# Patient Record
Sex: FEMALE | Race: WHITE | URBAN - METROPOLITAN AREA
[De-identification: names, ages, dates, MRNs, and addresses within clinical notes are randomized per-mention and may not be internally consistent; named-entity substitution may affect disease eponyms.]

---

## 2017-06-01 ENCOUNTER — PRE VISIT (OUTPATIENT)
Dept: OTOLARYNGOLOGY | Facility: CLINIC | Age: 62
End: 2017-06-01

## 2017-06-01 NOTE — TELEPHONE ENCOUNTER
ENT PRE VISIT NOTE    On the phone call:    Date of Call: June 1, 2017  Date of appointment: June 19, 2017  Reason for Call (Should match scheduling appointment note): Dysphonia   Who made the initial call:  (patient, Parent (Name of Parent), referral source) Patient   Who is the Referring Provider? (Name, address, phone number, location of clinic) Self   Where have you been seen for this condition[facility]  Where and what testing has been done including: None  ENT related surgeries in the past: Yes rhinoplasty   Emailed [facility].    Request medical records: No  From where:   What date:   Who did you speak to:   Is the information already in the EMR? No         Have films been pushed to PAC and when? no.     Have slides been sent to pathology and when?       Have outside records been received? No

## 2017-10-26 NOTE — TELEPHONE ENCOUNTER
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 Pt also cites throat has felt dry   Ref Provider/Clinic: Self   Patient Contact (Y/N) & Call Details: Yes, No Answer. Need to find out where related records are.    Action: LVM     RECORDS CLINIC NAME  (\"None\" if no records ) RECEIVED RECS & IMG? Y/N   (may include other helpful notes)   Internal Clinics: None    External Clinics: None per pt             "

## 2017-10-26 NOTE — TELEPHONE ENCOUNTER
Phone Call:    Who did you talk to? (or) Who did you call? Patient   Call Detail/Action: Patient returned call, says she has no records for issue

## 2017-11-06 ENCOUNTER — PRE VISIT (OUTPATIENT)
Dept: OTOLARYNGOLOGY | Facility: CLINIC | Age: 62
End: 2017-11-06

## 2017-11-06 ENCOUNTER — OFFICE VISIT (OUTPATIENT)
Dept: OTOLARYNGOLOGY | Facility: CLINIC | Age: 62
End: 2017-11-06

## 2017-11-06 DIAGNOSIS — J38.7 IRRITABLE LARYNX: ICD-10-CM

## 2017-11-06 DIAGNOSIS — R49.0 MUSCLE TENSION DYSPHONIA: Primary | ICD-10-CM

## 2017-11-06 DIAGNOSIS — J38.7 IRRITABLE LARYNX SYNDROME: ICD-10-CM

## 2017-11-06 DIAGNOSIS — J31.0 CHRONIC RHINITIS, UNSPECIFIED TYPE: ICD-10-CM

## 2017-11-06 DIAGNOSIS — R49.0 DYSPHONIA: Primary | ICD-10-CM

## 2017-11-06 RX ORDER — FLUOXETINE 10 MG/1
30 CAPSULE ORAL
COMMUNITY
Start: 2017-10-12

## 2017-11-06 RX ORDER — EPINEPHRINE 0.3 MG/.3ML
0.3 INJECTION SUBCUTANEOUS
COMMUNITY
Start: 2016-09-22

## 2017-11-06 RX ORDER — IBUPROFEN 600 MG/1
600 TABLET, FILM COATED ORAL
COMMUNITY
Start: 2015-04-02

## 2017-11-06 RX ORDER — LEVOTHYROXINE SODIUM 112 UG/1
112 TABLET ORAL
COMMUNITY
Start: 2017-03-20

## 2017-11-06 ASSESSMENT — PAIN SCALES - GENERAL: PAINLEVEL: NO PAIN (0)

## 2017-11-06 NOTE — PATIENT INSTRUCTIONS
1.  You were seen in the ENT Clinic today by Dr. Dunn.  If you have any questions or concerns after your appointment, please call [phone].  Press option #1 for scheduling related needs.  Press option #3 for Nurse advice.  2.  Please initiate speech therapy at the [facility] - [facility].     Alexandrea NUÑEZ, RN  [facility] ENT   Head & Neck Surgery

## 2017-11-06 NOTE — NURSING NOTE
Chief Complaint   Patient presents with     Consult     Vocal dryness and phlegm build up      Eric Nunez

## 2017-11-06 NOTE — LETTER
Date:November 13, 2017      Patient was self referred, no letter generated. Do not send.        [facility] Information

## 2017-11-06 NOTE — LETTER
11/6/2017       RE: Carol More  [address] [address]     Dear Colleague,    Thank you for referring your patient, Carol More, to the [facility] at [facility]. Please see a copy of my visit note below.    [facility]  Donavan Ford Jr., M.D., F.A.C.S.  Meghana Dunn M.D., M.P.H.  Marine Cardozo, Ph.D., CCC/SLP  Pebbles Cleveland M.M. (voice), M.A., CCC/SLP  Maximiliano Dela Cruz M.M. (voice), M.A., CCC/SLP    Patient: Carol More  Date of Visit: 11/6/2017    CHIEF COMPLAINT: dysphonia    HISTORY  Carol More was seen for initial voice evaluation and laryngeal examination in conjunction with a visit to Dr. Dunn.  Please refer to Dr. Dunn s dictation for additional history and impressions. Salient details of her history are as follows:    Ms. More is a 62 year old female with a history of dysphonia.    Symptoms began a couple years ago and have gradually worsened.    CURRENT SYMPTOMS INCLUDE  VOICE    Complains of gradually worsening voice breaks, change of volume and range.    Worse in the morning and with heavy use.    Reports that her typical vocal demands are low; quiet for long periods of time throughout the day.  She is now home most days, and running errands.  If she reads a book out loud she will become dry after reading several pages.    Sings approximately 20 hours per month; sings soprano.  Symptoms have most significantly changed within the past year.    Singing voice: most affected, especially her upper register.  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    Feeling performance anxiety to make the note, and throat clearing more to try to achieve the pitches she is trying to reach.    A couple rehearsals ago, she felt that her voice was clear, but is not sure why it was better. Occurred during a Wednesday evening rehearsal.    She is concerned that her voice issues are related to  "her age.  COUGH/THROAT CLEARING    Complains of throat tightness, dry throat, mucus/ post nasal drainage in the throat and frequent throat clearing.     Symptoms have been present for many years  SWALLOWING    Denies swallowing issues  BREATHING    Denies any issues   ADDITIONAL    2-3 cups of caffeine per day; 48 oz of non-caffeinated liquid/day    Was on hormone replacement treatment for over 10 years; hot flashes were horrible, but are finally improving. Discontinued use in April; upherectomy 2 years ago when large fibroma removed bilaterally    Rhinitis year rough; nose feels like a faucet.     OTHER PERTINENT HISTORY    Please also refer to Dr. Dunn s dictation for additional history and impressions.    Rhinoplasty when she was 30  No past medical history on file.No past surgical history on file.    OBJECTIVE  PATIENT REPORTED MEASURES    Effort to talk: 1 / 10 (0-10 in which 10 represents maximal effort)    Effort to sin / 10 (0-10 in which 10 represents maximal effort)    Voice quality: 6 / 10 (0-10 in which 10 represents best possible voice)    Cough severity: throat clearing 6 / 10 (0-10 in which 10 represents the worst cough and 0 no cough at all)     VPC Mean: 2.87  Patient Supplied Answers To VHI Questionnaire  Voice Handicap Index (VHI-10) 2017   My voice makes it difficult for people to hear me 0   People have difficulty understanding me in a noisy room 0   My voice difficulties restrict my personal and social life.  0   I feel left out of conversations because of my voice 0   My voice problem causes me to lose income 0   I feel as though I have to strain to produce voice 0   The clarity of my voice is unpredictable 0   My voice problem upsets me 0   My voice makes me feel handicapped 0   People ask, \"What's wrong with your voice?\" 0   VHI-10 0       Patient Supplied Answers To CSI Questionnaire  Cough Severity Index (CSI) 2017   My cough is worse when I lie down. Sometimes   My " "coughing problem causes me to restrict my personal and social life. Never   I tend to avoid places because of my cough problem. Never   I feel embarrassed because of my coughing problem. Never   People ask, \"What's wrong?\" because I cough a lot. Never   I run out of air when I cough. Never   My coughing problem affects my voice. Sometimes   My coughing problem limits my physical activity. Never   My coughing problem upsets me. Sometimes   People ask me if I am sick because I cough a lot. Never   CSI Total Score 6       Patient Supplied Answers To EAT Questionnaire  Eating Assessment Tool (EAT-10) 11/6/2017   My swallowing problem has caused me to lose weight. 0   My swallowing problem interferes with my ability to go out for meals. 0   Swallowing solids takes extra effort. 0   Swallowing pills takes extra effort. 0   Swallowing is painful. 0   The pleasure of eating is affected by my swallowing. 0   When I swallow food sticks in my throat. 0   I cough when I eat. 0   Swallowing is stressful. 0   How often do things go down the wrong way when you swallow? Rarely   Have you had pneumonia, bronchitis, or another severe lung infection in the last 6 months? No   EAT Total Score 0     PERCEPTUAL EVALUATION (CPT 80888) - Recorded  VOICE:    Roughness: Mild to moderate Intermittent    Breathiness: Mild Intermittent    Strain: Mild to moderate Intermittent    Back focus while singing    Habitual pitch was not formally tested, but is judged to be WNL and appropriate    Typical vs Symptomatic:     Loudness    Loudness is WNL and appropriate for the setting    Pitch:    Conversational speech:  WNL    Pitch glide: neurologically normal, but upper range is limited   Sustained vowels:     Comfortable pitch /a/: mild to moderate roughness and breathiness    Comfortable pitch /i/: mild to moderate roughness and breathiness    Tension is evident:    Upper body    Neck and shoulders    CAPE-V Overall Severity:  41/100;  Global " Dysphonia Severity:  51/100    BREATHING:     Breathing pattern:    Appears within normal limits and adequate     Clavicular  muscle use pattern    Phonation is not coordinated with respiration    Talks to past end of breath stream    COUGH/THROAT CLEARING:    Occasional    Dry    Locus of cough/ throat clear: sounds consistent with upper airway    Mild to moderate    POSTURE / TENSION:     Palpation of the laryngeal area shows:    Firm musculature    Tenderness of the thyrohyoid area     Reduced thyrohyoid space     LARYNGEAL EXAMINATION  Procedure: Flexible endoscopy with chip-tip technology with stroboscopy, right nostril; topical anesthesia with 3% Lidocaine and 0.25% phenylephrine was applied.   Performed by: Dr. Dunn   The laryngeal and pharyngeal structures were evaluated for gross appearance, mobility, function, and focal lesions / abnormalities of the associated mucosa.  Stroboscopy was warranted to evaluate closure, symmetry, and vibratory characteristics of the vocal folds.  All findings were within normal limits with the exception of the following salient features:     Minimal post nasal drainage observed.    Vocal fold mobility was overall within normal limits; occasionally the left vocal fold appeared slightly reduced in mobility (possibly secondary to muscle tension dysphonia).    Moderate hyperfunction noted with connected speech tasks; hyperfunction also noted during a singing task.  However, use of a forward resonance technique helped reduce hyperfunction.    Symmetry:    Amplitude: WNL bilaterally    Symmetry: intermittently asymmetrical.    Glottic adduction: complete    Phase: WNL    The laryngeal exam was reviewed with Ms. More, and I provided pertinent explanations, as well as written and oral information.    ASSESSMENT / PLAN  IMPRESSIONS: R49.0 (Dysphonia) and J38.7 (Irritable Larynx Syndrome)     Laryngeal evaluation demonstrated:  All findings were within normal limits with the  exception of the following salient features:     Minimal post nasal drainage observed.    Vocal fold mobility was overall within normal limits; occasionally the left vocal fold appeared slightly reduced in mobility (possibly secondary to muscle tension dysphonia).    Moderate hyperfunction noted with connected speech tasks; hyperfunction also noted during a singing task.  However, use of a forward resonance technique helped reduce hyperfunction.    Symmetry:    Amplitude: WNL bilaterally    Symmetry: intermittently asymmetrical.    Glottic adduction: complete    Phase: WNL      Dysphonia/discomfort is accounted for by the hyperfunction and imbalanced function of the intrinsic and extrinsic laryngeal musculature    STIMULABILITY: results of therapy probes during perceptual and laryngeal evaluation demonstrate improvement with use of forward resonant stimuli and coordination of respiration and phonation  RECOMMENDATIONS:     A course of speech therapy is recommended to optimize vocal technique, improve voice quality, promote reduced discomfort, effort and fatigue and help reduce mucosal irritation.    She demonstrates a Good prognosis for improvement given adherence to therapeutic recommendations. Therapeutic     Positive indicators: motivation    Negative indicators: none    DURATION / FREQUENCY: 2 one-hour weekly sessions, followed by 2 bi-weekly sessions.  A total of 6-8 sessions may be necessary to resolve symptoms to within normal limits.    GOALS:  Patient goal:   1. To improve and maintain a healthy voice quality  2. To understand the problem and fix it as much as possible    Long-term goal(s): In 6 months, Ms. More will:  1. Report a week of typical vocal activities, in which dysphonia and discomfort do not exceed a level of 3 out of 10, 80% of the time   This treatment plan was developed with the patient who agreed with the recommendations.    TOTAL SERVICE:  EVALUATION OF VOICE AND RESONANCE: (52857): 60  minutes    NO CHARGE FACILITY FEE (26235)    Pebbles Cleveland M.M. (voice), MArslanA., CCC/SLP  Speech-Language Pathologist  [facility]  [phone]                        Again, thank you for allowing me to participate in the care of your patient.      Sincerely,    Pebbles Cleveland, SLP

## 2017-11-06 NOTE — PROGRESS NOTES
Dear Colleague:    I had the pleasure of meeting Carol More in consultation at the [facility] of the [facility] on a self-referred basis, for evaluation of dysphonia. The note from our visit follows.  I appreciate the opportunity to participate in the care of this pleasant patient.    Please feel free to contact me with any questions.    Sincerely yours,    Meghana Dunn M.D., M.P.H.  , Laryngology  Director, [facility]  Otolaryngology- Head & Neck Surgery  [phone]          =====    History of Present Illness:   Carol More is a pleasant 62-year-old female who presents with a history of dysphonia and throat concerns. Symptoms include throat tightness, dry throat, mucus in throat, frequent throat-clearing, voice breaks, change in vocal volume and change in range.      Voice  She states she has had a voice problem over the past couple of years that is possibly associated with rhinitis.  Typical vocal demand is undemanding.  Her voice is worse in the morning and after heavy voice use. She sings about 20 hour per month and her upper range is most affected.  She does feel that her voice is sometimes normal.  She was on hormone replacement for a number of years, and stopped in April and had difficulty with side effects and dry mouth.  She was seen by Dr. Efrem Borjas in 2009 for rhinitis.      Swallowing  No concerns.       Cough/Throat-clearing  She has had cough/throat clearing for greater than four years.  It feels dry and is 75% controllable.      Breathing  No concerns.       Throat discomfort  She chronically has a sense of post-nasal drainage.      Reflux-type symptoms  She experiences heartburn/indigestion rarely. She is not taking reflux medications.        Limited prior outside records were available for this visit. She had a rhinoplasty at age 30, oophorectomies and fibroid removals two years  ago.      MEDICATIONS:     Current Outpatient Prescriptions   Medication Sig Dispense Refill     ibuprofen (ADVIL/MOTRIN) 600 MG tablet Take 600 mg by mouth       levothyroxine (SYNTHROID/LEVOTHROID) 112 MCG tablet Take 112 mcg by mouth       FLUoxetine (PROZAC) 10 MG capsule Take 30 mg by mouth       Fexofenadine HCl (ALLEGRA PO) Take 30 mg by mouth daily       EPINEPHrine (EPIPEN/ADRENACLICK/OR ANY BX GENERIC EQUIV) 0.3 MG/0.3ML injection 2-pack Inject 0.3 mg into the muscle         ALLERGIES:    Allergies   Allergen Reactions     Oxycodone-Acetaminophen Hives       PAST MEDICAL HISTORY:   Past Medical History:   Diagnosis Date     Chronic sinusitis      Depressive disorder     Have had low grade depression most of my life     Thyroid disease     About 10 years     Tinnitus         PAST SURGICAL HISTORY:   Past Surgical History:   Procedure Laterality Date     GI SURGERY       GYN SURGERY      Had very large ovarian fibroid removed along with oopherecto     HEAD & NECK SURGERY      Had rhinoplasty age 30     ORTHOPEDIC SURGERY      R & L knee arthroscopy       HABITS/SOCIAL HISTORY:    Social History   Substance Use Topics     Smoking status: Never Smoker     Smokeless tobacco: Not on file     Alcohol use Yes      Comment: Occasional glass of wine         FAMILY HISTORY:    Family History   Problem Relation Age of Onset     CANCER Mother      CEREBROVASCULAR DISEASE Mother       of massive stroke in brain stem 2013     Substance Abuse Mother      Alcohol/cigarettes     MENTAL ILLNESS Paternal Grandmother      Tried to commit suicide several times     Substance Abuse Father      Alcohol     Congenital Anomalies Brother      Neural tube defect     OSTEOPOROSIS Maternal Grandmother        REVIEW OF SYSTEMS:  The patient completed a comprehensive 11 point review of systems (below), which was reviewed. Positives are as noted below; pertinent findings are as noted in the HPI.     Patient Supplied Answers to Review  of Systems   ENT ROS 11/6/2017   Constitutional Problems with sleep   Ears, Nose, Throat Ringing/noise in ears, Nasal congestion or drainage   Allergy/Immunology Allergies or hay fever       PHYSICAL EXAMINATION:  General: The patient was alert and conversant, and in no acute distress.    Eyes: PERRL, conjunctiva and lids normal, sclera nonicteric.  Nose: Anterior rhinoscopy: no gross abnormalities. no  bleeding; no  mucopurulence; septum grossly normal, mild mucoid drainage and/or crusting.  Oral cavity/oropharynx: No masses or lesions. Dentition in good condition. Floor of mouth and oral tongue soft to palpation. Tongue mobility and palate elevation intact and symmetric.  Ears: Normal auricles, external auditory canals bilaterally. Visualized portions of tympanic membranes normal bilaterally.   Neck: No palpable cervical lymphadenopathy. There was no significant tenderness to palpation of the thyrohyoid space, which was not particularly narrow. No obvious thyroid abnormality. Landmarks palpable.  Resp: Breathing comfortably, no stridor or stertor.  Neuro: Symmetric facial function. Other cranial nerves as documented above.  Psych: Normal affect, pleasant and cooperative.  Voice/speech: No significant dysphonia of speaking voice. Intermittent throat-clearing.  Extremities: No cyanosis, clubbing, or edema of the upper extremities.      Intake scores  Total Score for Last Patient-Answered VHI Questionnaire  VHI Total Score 11/6/2017   VHI Total Score 0     Total Score for Last Patient-Answered EAT Questionnaire  EAT Total Score 11/6/2017   EAT Total Score 0     Total Score for Last Patient-Answered CSI Questionnaire  CSI Total Score 11/6/2017   CSI Total Score 6           PROCEDURE:   Flexible fiberoptic laryngoscopy and laryngovideostroboscopy  Indications: This procedure was warranted to evaluate the patient's laryngeal anatomy and function. Risks, benefits, and alternatives were discussed.  Description: After  written informed consent was obtained, a time-out was performed to confirm patient identity, procedure, and procedure site. Topical 3% lidocaine with 0.25% phenylephrine was applied to the nasal cavities. I performed the endoscopy and no complications were apparent. Continuous and stroboscopic light were utilized to assess routine phonation and variable frequency phonation.  Performed by: Meghana Dunn MD MPH  SLP: Pebbles Cleveland MM, MA, CCC-SLP   Findings: Normal nasopharynx. Normal base of tongue, valleculae, and epiglottis. Vocal fold mobility: right: normal; left: full, but frequently slightly sluggish compared to right. Medial edges of the vocal folds: smooth and straight. No focal mucosal lesions were observed on the true vocal folds. Glissade produced appropriate elongation. There was moderate supraglottic recruitment with connected speech. Mucosa of false vocal folds, aryepiglottic folds, piriform sinuses, and posterior glottis unremarkable. Airway was patent. Response to the therapy probes was good.  Mild asymmetric twist of arytenoids with some phonatory tasks.    The addition of stroboscopy allowed evaluation of the mucosal wave.   Amplitude: right: normal; left: normal. Symmetry: intermittent symmetry. Closure pattern: complete. Closure plane: at glottic level. Phase distribution: normal.        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 She may have a very subtle left superior laryngeal nerve paresis but this appearance may alternatively be functional.    I recommended speech therapy as initial primary management, with goals including improving laryngeal hygiene, reducing laryngeal irritability, improving laryngeal efficiency, improving respiratory/phonatory coordination and improving phonatory mechanics. I expect that reducing her laryngeal sensitivity will also help with her vocal function.    With  respect to the intermittent mild sluggishness of the left true vocal fold, we discussed consideration of neck imaging to rule out occult contributing lesions. We discussed that the likelihood of significant findings is low, but non-zero. For now she would prefer to hold off, but will call if she changes her mind. We discussed that if any of her voice symptoms worsen over time, imaging would certainly be recommended.    She will return as needed. I appreciate the opportunity to participate in the care of this pleasant patient.

## 2017-11-06 NOTE — Clinical Note
11/6/2017       RE: Carol More  [address] [facility] [address] [address]     Dear Colleague,    Thank you for referring your patient, Carol More, to the [facility] EAR NOSE AND THROAT at [facility]. Please see a copy of my visit note below.    Dear Colleague:    I had the pleasure of meeting Carol More in consultation at the [facility] of the [facility] on a self-referred basis, for evaluation of dysphonia. The note from our visit follows.  I appreciate the opportunity to participate in the care of this pleasant patient.    Please feel free to contact me with any questions.    Sincerely yours,    Meghana Dunn M.D., M.P.H.  , Laryngology  Director, [facility]  Otolaryngology- Head & Neck Surgery  [phone]          =====    History of Present Illness:   Carol More is a pleasant 62-year-old female who presents with a history of dysphonia and throat concerns. Symptoms include throat tightness, dry throat, mucus in throat, frequent throat-clearing, voice breaks, change in vocal volume and change in range.      Voice  She states she has had a voice problem over the past couple of years that is possibly associated with rhinitis.  Typical vocal demand is undemanding.  Her voice is worse in the morning and after heavy voice use. She sings about 20 hour per month and her upper range is most affected.  She does feel that her voice is sometimes normal.  she was on hormone replacement for a number of years, and stopped in April and had difficulty with side effects and dry mouth.  She was seen by Dr. Efrem Borjas in 2009 for rhinitis.      Swallowing  No concerns.       Cough/Throat-clearing  She has had cough/throat clearing for greater than four years.  It feels dry and is 75% controllable.      Breathing  No concerns.       Throat discomfort  She chronically has a sense of post-nasal  drainage.      Reflux-type symptoms  She experiences heartburn/indigestion rarely. She is not taking reflux medications.        Limited prior outside records were available for this visit. She had a rhinoplasty at age 30, oophorectomies and fibroid removals two years ago.      MEDICATIONS:     Current Outpatient Prescriptions   Medication Sig Dispense Refill     ibuprofen (ADVIL/MOTRIN) 600 MG tablet Take 600 mg by mouth       levothyroxine (SYNTHROID/LEVOTHROID) 112 MCG tablet Take 112 mcg by mouth       FLUoxetine (PROZAC) 10 MG capsule Take 30 mg by mouth       Fexofenadine HCl (ALLEGRA PO) Take 30 mg by mouth daily       EPINEPHrine (EPIPEN/ADRENACLICK/OR ANY BX GENERIC EQUIV) 0.3 MG/0.3ML injection 2-pack Inject 0.3 mg into the muscle         ALLERGIES:    Allergies   Allergen Reactions     Oxycodone-Acetaminophen Hives       PAST MEDICAL HISTORY:   Past Medical History:   Diagnosis Date     Chronic sinusitis      Depressive disorder     Have had low grade depression most of my life     Thyroid disease     About 10 years     Tinnitus         PAST SURGICAL HISTORY:   Past Surgical History:   Procedure Laterality Date     GI SURGERY       GYN SURGERY      Had very large ovarian fibroid removed along with oopherecto     HEAD & NECK SURGERY      Had rhinoplasty age 30     ORTHOPEDIC SURGERY      R & L knee arthroscopy       HABITS/SOCIAL HISTORY:    Social History   Substance Use Topics     Smoking status: Never Smoker     Smokeless tobacco: Not on file     Alcohol use Yes      Comment: Occasional glass of wine         FAMILY HISTORY:    Family History   Problem Relation Age of Onset     CANCER Mother      CEREBROVASCULAR DISEASE Mother       of massive stroke in brain stem      Substance Abuse Mother      Alcohol/cigarettes     MENTAL ILLNESS Paternal Grandmother      Tried to commit suicide several times     Substance Abuse Father      Alcohol     Congenital Anomalies Brother      Neural tube defect      OSTEOPOROSIS Maternal Grandmother        REVIEW OF SYSTEMS:  The patient completed a comprehensive 11 point review of systems (below), which was reviewed. Positives are as noted below; pertinent findings are as noted in the HPI.     Patient Supplied Answers to Review of Systems   ENT ROS 11/6/2017   Constitutional Problems with sleep   Ears, Nose, Throat Ringing/noise in ears, Nasal congestion or drainage   Allergy/Immunology Allergies or hay fever       PHYSICAL EXAMINATION:  General: The patient was alert and conversant, and in no acute distress.    Eyes: PERRL, conjunctiva and lids normal, sclera nonicteric.  Nose: Anterior rhinoscopy: no gross abnormalities. no  bleeding; no  mucopurulence; septum grossly normal, mild mucoid drainage and/or crusting.  Oral cavity/oropharynx: No masses or lesions. Dentition in good condition. Floor of mouth and oral tongue soft to palpation. Tongue mobility and palate elevation intact and symmetric.  Ears: Normal auricles, external auditory canals bilaterally. Visualized portions of tympanic membranes normal bilaterally.   Neck: No palpable cervical lymphadenopathy. There was no significant tenderness to palpation of the thyrohyoid space, which was not particularly narrow. No obvious thyroid abnormality. Landmarks palpable.  Resp: Breathing comfortably, no stridor or stertor.  Neuro: Symmetric facial function. Other cranial nerves as documented above.  Psych: Normal affect, pleasant and cooperative.  Voice/speech: No significant dysphonia of speaking voice. Intermittent throat-clearing.  Extremities: No cyanosis, clubbing, or edema of the upper extremities.      Intake scores  Total Score for Last Patient-Answered VHI Questionnaire  VHI Total Score 11/6/2017   VHI Total Score 0     Total Score for Last Patient-Answered EAT Questionnaire  EAT Total Score 11/6/2017   EAT Total Score 0     Total Score for Last Patient-Answered CSI Questionnaire  CSI Total Score 11/6/2017   CSI  Total Score 6           PROCEDURE:   Flexible fiberoptic laryngoscopy and laryngovideostroboscopy  Indications: This procedure was warranted to evaluate the patient's laryngeal anatomy and function. Risks, benefits, and alternatives were discussed.  Description: After written informed consent was obtained, a time-out was performed to confirm patient identity, procedure, and procedure site. Topical 3% lidocaine with 0.25% phenylephrine was applied to the nasal cavities. I performed the endoscopy and no complications were apparent. Continuous and stroboscopic light were utilized to assess routine phonation and variable frequency phonation.  Performed by: Meghana Dunn MD MPH  SLP: Pebbles Cleveland MM, MA, CCC-SLP   Findings: Normal nasopharynx. Normal base of tongue, valleculae, and epiglottis. Vocal fold mobility: right: normal; left: full, but frequently slightly sluggish compared to right. Medial edges of the vocal folds: smooth and straight. No focal mucosal lesions were observed on the true vocal folds. Glissade produced appropriate elongation. There was moderate supraglottic recruitment with connected speech. Mucosa of false vocal folds, aryepiglottic folds, piriform sinuses, and posterior glottis unremarkable. Airway was patent. Response to the therapy probes was good.  Mild asymmetric twist of arytenoids with some phonatory tasks.    The addition of stroboscopy allowed evaluation of the mucosal wave.   Amplitude: right: normal; left: normal. Symmetry: intermittent symmetry. Closure pattern: complete. Closure plane: at glottic level. Phase distribution: normal.        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 She may have a very subtle left superior laryngeal nerve paresis but this appearance may alternatively be functional.    I recommended speech therapy as initial primary management, with goals including  improving laryngeal hygiene, reducing laryngeal irritability, improving laryngeal efficiency, improving respiratory/phonatory coordination and improving phonatory mechanics. I expect that reducing her laryngeal sensitivity will also help with her vocal function.    With respect to the intermittent mild sluggishness of the left true vocal fold, we discussed consideration of neck imaging to rule out occult contributing lesions. We discussed that the likelihood of significant findings is low, but non-zero. For now she would prefer to hold off, but will call if she changes her mind. We discussed that if any of her voice symptoms worsen over time, imaging would certainly be recommended.    She will return as needed. I appreciate the opportunity to participate in the care of this pleasant patient.            Again, thank you for allowing me to participate in the care of your patient.      Sincerely,    Meghana Dunn MD

## 2017-11-06 NOTE — NURSING NOTE
Procedure: Upper aerodigestive tract endoscopy, Flexible or rigid laryngoscopy, possible stroboscopy, possible flexible endoscopic evaluation of swallowing   Reason: symptoms requiring examination   PREPROCEDURE:   Yes Patient ID verified with 2 identifiers (name and  or MRN)   Yes: Procedure and site verified with patient/designee (when able)   Yes: Accurate consent documentation in medical record   No: Marking not required. Reason: [ Procedure does not require site marking. ][ Provider is in continuous attendance with the patient from consent through completion of procedure. ][ Marking unable or refused by patient (see scanned diagram).   TIME OUT:   Yes: Time-Out performed immediately prior to starting procedure, including verbal and active participation of all team members addressing:   * Correct patient identity   * Confirmed that the correct side and site are marked   * An accurate procedure consent form   * Agreement on the procedure to be done   * Correct patient position   * Relevant images and results are properly labeled and appropriately displayed   * The need to administer antibiotics or fluids for irrigation purposes during the procedure as applicable   * Safety precations based on patient history or medication use   DURING PROCEDURE: Verification of correct person, site, and procedure occurs any time the responsibility for care of the patient is transferred to another member of the care team.   Alexandrea Simmons RN  P Otolaryngology/Head & Neck Surgery

## 2017-11-06 NOTE — LETTER
Date:November 13, 2017      Patient was self referred, no letter generated. Do not send.        [facility] Health Information

## 2017-11-06 NOTE — PROGRESS NOTES
[facility] VOICE CLINIC  Donavan Ford Jr., M.D., F.A.C.S.  Meghana Dunn M.D., M.P.H.  Marine Cardozo, Ph.D., CCC/SLP  Pebbles Cleveland M.M. (voice), MMAXIMILIAN., CCC/SLP  Maximiliano Dela Cruz M.M. (voice), MMAXIMILIAN., CCC/SLP    Patient: Carol More  Date of Visit: 11/6/2017    CHIEF COMPLAINT: dysphonia    HISTORY  Carol More was seen for initial voice evaluation and laryngeal examination in conjunction with a visit to Dr. Dunn.  Please refer to Dr. Dunn s dictation for additional history and impressions. Salient details of her history are as follows:    Ms. More is a 62 year old female with a history of dysphonia.    Symptoms began a couple years ago and have gradually worsened.    CURRENT SYMPTOMS INCLUDE  VOICE    Complains of gradually worsening voice breaks, change of volume and range.    Worse in the morning and with heavy use.    Reports that her typical vocal demands are low; quiet for long periods of time throughout the day.  She is now home most days, and running errands.  If she reads a book out loud she will become dry after reading several pages.    Sings approximately 20 hours per month; sings soprano.  Symptoms have most significantly changed within the past year.    Singing voice: most affected, especially her upper register.  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    Feeling performance anxiety to make the note, and throat clearing more to try to achieve the pitches she is trying to reach.    A couple rehearsals ago, she felt that her voice was clear, but is not sure why it was better. Occurred during a Wednesday evening rehearsal.    She is concerned that her voice issues are related to her age.  COUGH/THROAT CLEARING    Complains of throat tightness, dry throat, mucus/ post nasal drainage in the throat and frequent throat clearing.     Symptoms have been present for many years  SWALLOWING    Denies swallowing issues  BREATHING    Denies any  "issues   ADDITIONAL    2-3 cups of caffeine per day; 48 oz of non-caffeinated liquid/day    Was on hormone replacement treatment for over 10 years; hot flashes were horrible, but are finally improving. Discontinued use in April; upherectomy 2 years ago when large fibroma removed bilaterally    Rhinitis year rough; nose feels like a faucet.     OTHER PERTINENT HISTORY    Please also refer to Dr. Dunn s dictation for additional history and impressions.    Rhinoplasty when she was 30  No past medical history on file.No past surgical history on file.    OBJECTIVE  PATIENT REPORTED MEASURES    Effort to talk: 1 / 10 (0-10 in which 10 represents maximal effort)    Effort to sin / 10 (0-10 in which 10 represents maximal effort)    Voice quality: 6 / 10 (0-10 in which 10 represents best possible voice)    Cough severity: throat clearing 6 / 10 (0-10 in which 10 represents the worst cough and 0 no cough at all)     VPC Mean: 2.87  Patient Supplied Answers To VHI Questionnaire  Voice Handicap Index (VHI-10) 2017   My voice makes it difficult for people to hear me 0   People have difficulty understanding me in a noisy room 0   My voice difficulties restrict my personal and social life.  0   I feel left out of conversations because of my voice 0   My voice problem causes me to lose income 0   I feel as though I have to strain to produce voice 0   The clarity of my voice is unpredictable 0   My voice problem upsets me 0   My voice makes me feel handicapped 0   People ask, \"What's wrong with your voice?\" 0   VHI-10 0       Patient Supplied Answers To CSI Questionnaire  Cough Severity Index (CSI) 2017   My cough is worse when I lie down. Sometimes   My coughing problem causes me to restrict my personal and social life. Never   I tend to avoid places because of my cough problem. Never   I feel embarrassed because of my coughing problem. Never   People ask, \"What's wrong?\" because I cough a lot. Never   I run out of " air when I cough. Never   My coughing problem affects my voice. Sometimes   My coughing problem limits my physical activity. Never   My coughing problem upsets me. Sometimes   People ask me if I am sick because I cough a lot. Never   CSI Total Score 6       Patient Supplied Answers To EAT Questionnaire  Eating Assessment Tool (EAT-10) 11/6/2017   My swallowing problem has caused me to lose weight. 0   My swallowing problem interferes with my ability to go out for meals. 0   Swallowing solids takes extra effort. 0   Swallowing pills takes extra effort. 0   Swallowing is painful. 0   The pleasure of eating is affected by my swallowing. 0   When I swallow food sticks in my throat. 0   I cough when I eat. 0   Swallowing is stressful. 0   How often do things go down the wrong way when you swallow? Rarely   Have you had pneumonia, bronchitis, or another severe lung infection in the last 6 months? No   EAT Total Score 0     PERCEPTUAL EVALUATION (CPT 40626) - Recorded  VOICE:    Roughness: Mild to moderate Intermittent    Breathiness: Mild Intermittent    Strain: Mild to moderate Intermittent    Back focus while singing    Habitual pitch was not formally tested, but is judged to be WNL and appropriate    Typical vs Symptomatic:     Loudness    Loudness is WNL and appropriate for the setting    Pitch:    Conversational speech:  WNL    Pitch glide: neurologically normal, but upper range is limited   Sustained vowels:     Comfortable pitch /a/: mild to moderate roughness and breathiness    Comfortable pitch /i/: mild to moderate roughness and breathiness    Tension is evident:    Upper body    Neck and shoulders    CAPE-V Overall Severity:  41/100;  Global Dysphonia Severity:  51/100    BREATHING:     Breathing pattern:    Appears within normal limits and adequate     Clavicular  muscle use pattern    Phonation is not coordinated with respiration    Talks to past end of breath stream    COUGH/THROAT  CLEARING:    Occasional    Dry    Locus of cough/ throat clear: sounds consistent with upper airway    Mild to moderate    POSTURE / TENSION:     Palpation of the laryngeal area shows:    Firm musculature    Tenderness of the thyrohyoid area     Reduced thyrohyoid space     LARYNGEAL EXAMINATION  Procedure: Flexible endoscopy with chip-tip technology with stroboscopy, right nostril; topical anesthesia with 3% Lidocaine and 0.25% phenylephrine was applied.   Performed by: Dr. Dunn   The laryngeal and pharyngeal structures were evaluated for gross appearance, mobility, function, and focal lesions / abnormalities of the associated mucosa.  Stroboscopy was warranted to evaluate closure, symmetry, and vibratory characteristics of the vocal folds.  All findings were within normal limits with the exception of the following salient features:     Minimal post nasal drainage observed.    Vocal fold mobility was overall within normal limits; occasionally the left vocal fold appeared slightly reduced in mobility (possibly secondary to muscle tension dysphonia).    Moderate hyperfunction noted with connected speech tasks; hyperfunction also noted during a singing task.  However, use of a forward resonance technique helped reduce hyperfunction.    Symmetry:    Amplitude: WNL bilaterally    Symmetry: intermittently asymmetrical.    Glottic adduction: complete    Phase: WNL    The laryngeal exam was reviewed with Ms. More, and I provided pertinent explanations, as well as written and oral information.    ASSESSMENT / PLAN  IMPRESSIONS: R49.0 (Dysphonia) and J38.7 (Irritable Larynx Syndrome)     Laryngeal evaluation demonstrated:  All findings were within normal limits with the exception of the following salient features:     Minimal post nasal drainage observed.    Vocal fold mobility was overall within normal limits; occasionally the left vocal fold appeared slightly reduced in mobility (possibly secondary to muscle tension  dysphonia).    Moderate hyperfunction noted with connected speech tasks; hyperfunction also noted during a singing task.  However, use of a forward resonance technique helped reduce hyperfunction.    Symmetry:    Amplitude: WNL bilaterally    Symmetry: intermittently asymmetrical.    Glottic adduction: complete    Phase: WNL      Dysphonia/discomfort is accounted for by the hyperfunction and imbalanced function of the intrinsic and extrinsic laryngeal musculature    STIMULABILITY: results of therapy probes during perceptual and laryngeal evaluation demonstrate improvement with use of forward resonant stimuli and coordination of respiration and phonation  RECOMMENDATIONS:     A course of speech therapy is recommended to optimize vocal technique, improve voice quality, promote reduced discomfort, effort and fatigue and help reduce mucosal irritation.    She demonstrates a Good prognosis for improvement given adherence to therapeutic recommendations. Therapeutic     Positive indicators: motivation    Negative indicators: none    DURATION / FREQUENCY: 2 one-hour weekly sessions, followed by 2 bi-weekly sessions.  A total of 6-8 sessions may be necessary to resolve symptoms to within normal limits.    GOALS:  Patient goal:   1. To improve and maintain a healthy voice quality  2. To understand the problem and fix it as much as possible    Long-term goal(s): In 6 months, Ms. More will:  1. Report a week of typical vocal activities, in which dysphonia and discomfort do not exceed a level of 3 out of 10, 80% of the time   This treatment plan was developed with the patient who agreed with the recommendations.    TOTAL SERVICE:  EVALUATION OF VOICE AND RESONANCE: (20918): 60 minutes    NO CHARGE FACILITY FEE (06866)    Pebbles Cleveland M.M. (voice), M.A., CCC/SLP  Speech-Language Pathologist  [facility]  [phone]

## 2017-11-06 NOTE — MR AVS SNAPSHOT
After Visit Summary   11/6/2017    Carol More    MRN: [id number]           Patient Information     Date Of Birth          1955        Visit Information        Provider Department      11/6/2017 11:50 AM Meghana Dunn MD [facility]        Today's Diagnoses     Muscle tension dysphonia    -  1    Irritable larynx        Chronic rhinitis, unspecified type          Care Instructions    1.  You were seen in the ENT Clinic today by Dr. Dunn.  If you have any questions or concerns after your appointment, please call [phone].  Press option #1 for scheduling related needs.  Press option #3 for Nurse advice.  2.  Please initiate speech therapy at the [facility].     Alexandrea NUÑEZ, RN  [facility] ENT   Head & Neck Surgery               Follow-ups after your visit        Additional Services     OTOLARYNGOLOGY REFERRAL       SPEECH-LANGUAGE PATHOLOGY SERVICE(S) REQUESTED:  Evaluate and treat    Marine Cardozo, Ph.D., CCC/SLP  Speech-Language Pathologist  Director, [facility]  [phone]    Pebbles Cleveland M.M., M.A., CCC/SLP  Speech-Language Pathologist  [facility]  [phone]                  Your next 10 appointments already scheduled     Nov 30, 2017  9:00 AM CST   (Arrive by 8:45 AM)   RETURN SLP VOICE with [facility] ([facility])    [address] [place] [id number] [phone]            Dec 07, 2017 11:00 AM CST   (Arrive by 10:45 AM)   RETURN SLP VOICE with [facility] ([facility])    [address] [place] [id number] [phone]            Dec 22, 2017  2:00 PM CST   (Arrive by 1:45 PM)   RETURN SLP VOICE with [facility] ([facility])    [address] [place] [address]  [address]   [phone]            Jan 04, 2018 11:00 AM CST   (Arrive by 10:45 AM)   RETURN SLP VOICE with ALMITA Hernandez    [facility] ([facility])    [address] [id number] [phone]              Who to contact     Please call your clinic at [phone] to:    Ask questions about your health    Make or cancel appointments    Discuss your medicines    Learn about your test results    Speak to your doctor   If you have compliments or concerns about an experience at your clinic, or if you wish to file a complaint, please contact [facility] Patient Relations at [phone] or email us at [email or web address]         Additional Information About Your Visit        [employer] Information     [employer] gives you secure access to your electronic health record. If you see a primary care provider, you can also send messages to your care team and make appointments. If you have questions, please call your primary care clinic.  If you do not have a primary care provider, please call [phone] and they will assist you.      [employer] is an electronic gateway that provides easy, online access to your medical records. With [employer], you can request a clinic appointment, read your test results, renew a prescription or communicate with your care team.     To access your existing account, please contact your [facility] or call [phone] for assistance.        Care EveryWhere ID     This is your Care EveryWhere ID. This could be used by other organizations to access your [place] medical records  [id number]         Blood Pressure from Last 3 Encounters:   No data found for BP    Weight from Last 3 Encounters:   No data found for Wt              We Performed the Following     IMAGESTREAM RECORDING ORDER     LARYNGOSCOPY FLEX/RIGID W STROBOSCOPY     OTOLARYNGOLOGY REFERRAL        Primary  Care Provider    None Specified       No primary provider on file.        Equal Access to Services     YULY SARKAR : Hadii aad ku hadjhoanasuha Bennett, misha sanchez, isaiasmayra mcmalizz shaikh. So [facility] [phone].    ATENCIÓN: Si habla español, tiene a rg disposición servicios gratuitos de asistencia lingüística. Llame al [phone].    We comply with applicable federal civil rights laws and [place] laws. We do not discriminate on the basis of race, color, national origin, age, disability, sex, sexual orientation, or gender identity.            Thank you!     Thank you for choosing [facility] E[facility]SE [facility]AT  for your care. Our goal is always to provide you with excellent care. Hearing back from our patients is one way we can continue to improve our services. Please take a few minutes to complete the written survey that you may receive in the mail after your visit with us. Thank you!             Your Updated Medication List - Protect others around you: Learn how to safely use, store and throw away your medicines at www.disposemymeds.org.          This list is accurate as of: 11/6/17 11:59 PM.  Always use your most recent med list.                   Brand Name Dispense Instructions for use Diagnosis    ALLEGRA PO      Take 30 mg by mouth daily        EPINEPHrine 0.3 MG/0.3ML injection 2-pack    EPIPEN/ADRENACLICK/or ANY BX GENERIC EQUIV     Inject 0.3 mg into the muscle        FLUoxetine 10 MG capsule    PROzac     Take 30 mg by mouth        ibuprofen 600 MG tablet    ADVIL/MOTRIN     Take 600 mg by mouth        levothyroxine 112 MCG tablet    SYNTHROID/LEVOTHROID     Take 112 mcg by mouth

## 2017-11-06 NOTE — LETTER
11/6/2017      RE: Carol More  [address] [address]       Dear Colleague:    I had the pleasure of meeting Carol More in consultation at the [facility] of the [facility] on a self-referred basis, for evaluation of dysphonia. The note from our visit follows.  I appreciate the opportunity to participate in the care of this pleasant patient.    Please feel free to contact me with any questions.    Sincerely yours,    Meghana Dunn M.D., M.P.H.  , Laryngology  Director, [facility]  Otolaryngology- Head & Neck Surgery  [phone]          =====    History of Present Illness:   Carol More is a pleasant 62-year-old female who presents with a history of dysphonia and throat concerns. Symptoms include throat tightness, dry throat, mucus in throat, frequent throat-clearing, voice breaks, change in vocal volume and change in range.      Voice  She states she has had a voice problem over the past couple of years that is possibly associated with rhinitis.  Typical vocal demand is undemanding.  Her voice is worse in the morning and after heavy voice use. She sings about 20 hour per month and her upper range is most affected.  She does feel that her voice is sometimes normal.  She was on hormone replacement for a number of years, and stopped in April and had difficulty with side effects and dry mouth.  She was seen by Dr. Efrem Borjas in 2009 for rhinitis.      Swallowing  No concerns.       Cough/Throat-clearing  She has had cough/throat clearing for greater than four years.  It feels dry and is 75% controllable.      Breathing  No concerns.       Throat discomfort  She chronically has a sense of post-nasal drainage.      Reflux-type symptoms  She experiences heartburn/indigestion rarely. She is not taking reflux medications.        Limited prior outside records were available for this visit. She had a rhinoplasty  at age 30, oophorectomies and fibroid removals two years ago.      MEDICATIONS:     Current Outpatient Prescriptions   Medication Sig Dispense Refill     ibuprofen (ADVIL/MOTRIN) 600 MG tablet Take 600 mg by mouth       levothyroxine (SYNTHROID/LEVOTHROID) 112 MCG tablet Take 112 mcg by mouth       FLUoxetine (PROZAC) 10 MG capsule Take 30 mg by mouth       Fexofenadine HCl (ALLEGRA PO) Take 30 mg by mouth daily       EPINEPHrine (EPIPEN/ADRENACLICK/OR ANY BX GENERIC EQUIV) 0.3 MG/0.3ML injection 2-pack Inject 0.3 mg into the muscle         ALLERGIES:    Allergies   Allergen Reactions     Oxycodone-Acetaminophen Hives       PAST MEDICAL HISTORY:   Past Medical History:   Diagnosis Date     Chronic sinusitis      Depressive disorder     Have had low grade depression most of my life     Thyroid disease     About 10 years     Tinnitus         PAST SURGICAL HISTORY:   Past Surgical History:   Procedure Laterality Date     GI SURGERY       GYN SURGERY      Had very large ovarian fibroid removed along with oopherecto     HEAD & NECK SURGERY      Had rhinoplasty age 30     ORTHOPEDIC SURGERY      R & L knee arthroscopy       HABITS/SOCIAL HISTORY:    Social History   Substance Use Topics     Smoking status: Never Smoker     Smokeless tobacco: Not on file     Alcohol use Yes      Comment: Occasional glass of wine         FAMILY HISTORY:    Family History   Problem Relation Age of Onset     CANCER Mother      CEREBROVASCULAR DISEASE Mother       of massive stroke in brain stem      Substance Abuse Mother      Alcohol/cigarettes     MENTAL ILLNESS Paternal Grandmother      Tried to commit suicide several times     Substance Abuse Father      Alcohol     Congenital Anomalies Brother      Neural tube defect     OSTEOPOROSIS Maternal Grandmother        REVIEW OF SYSTEMS:  The patient completed a comprehensive 11 point review of systems (below), which was reviewed. Positives are as noted below; pertinent findings are as  noted in the HPI.     Patient Supplied Answers to Review of Systems   ENT ROS 11/6/2017   Constitutional Problems with sleep   Ears, Nose, Throat Ringing/noise in ears, Nasal congestion or drainage   Allergy/Immunology Allergies or hay fever       PHYSICAL EXAMINATION:  General: The patient was alert and conversant, and in no acute distress.    Eyes: PERRL, conjunctiva and lids normal, sclera nonicteric.  Nose: Anterior rhinoscopy: no gross abnormalities. no  bleeding; no  mucopurulence; septum grossly normal, mild mucoid drainage and/or crusting.  Oral cavity/oropharynx: No masses or lesions. Dentition in good condition. Floor of mouth and oral tongue soft to palpation. Tongue mobility and palate elevation intact and symmetric.  Ears: Normal auricles, external auditory canals bilaterally. Visualized portions of tympanic membranes normal bilaterally.   Neck: No palpable cervical lymphadenopathy. There was no significant tenderness to palpation of the thyrohyoid space, which was not particularly narrow. No obvious thyroid abnormality. Landmarks palpable.  Resp: Breathing comfortably, no stridor or stertor.  Neuro: Symmetric facial function. Other cranial nerves as documented above.  Psych: Normal affect, pleasant and cooperative.  Voice/speech: No significant dysphonia of speaking voice. Intermittent throat-clearing.  Extremities: No cyanosis, clubbing, or edema of the upper extremities.      Intake scores  Total Score for Last Patient-Answered VHI Questionnaire  VHI Total Score 11/6/2017   VHI Total Score 0     Total Score for Last Patient-Answered EAT Questionnaire  EAT Total Score 11/6/2017   EAT Total Score 0     Total Score for Last Patient-Answered CSI Questionnaire  CSI Total Score 11/6/2017   CSI Total Score 6           PROCEDURE:   Flexible fiberoptic laryngoscopy and laryngovideostroboscopy  Indications: This procedure was warranted to evaluate the patient's laryngeal anatomy and function. Risks,  benefits, and alternatives were discussed.  Description: After written informed consent was obtained, a time-out was performed to confirm patient identity, procedure, and procedure site. Topical 3% lidocaine with 0.25% phenylephrine was applied to the nasal cavities. I performed the endoscopy and no complications were apparent. Continuous and stroboscopic light were utilized to assess routine phonation and variable frequency phonation.  Performed by: Meghana Dunn MD MPH  SLP: Pebbles Cleveland MM, MA, CCC-SLP   Findings: Normal nasopharynx. Normal base of tongue, valleculae, and epiglottis. Vocal fold mobility: right: normal; left: full, but frequently slightly sluggish compared to right. Medial edges of the vocal folds: smooth and straight. No focal mucosal lesions were observed on the true vocal folds. Glissade produced appropriate elongation. There was moderate supraglottic recruitment with connected speech. Mucosa of false vocal folds, aryepiglottic folds, piriform sinuses, and posterior glottis unremarkable. Airway was patent. Response to the therapy probes was good.  Mild asymmetric twist of arytenoids with some phonatory tasks.    The addition of stroboscopy allowed evaluation of the mucosal wave.   Amplitude: right: normal; left: normal. Symmetry: intermittent symmetry. Closure pattern: complete. Closure plane: at glottic level. Phase distribution: normal.        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 She may have a very subtle left superior laryngeal nerve paresis but this appearance may alternatively be functional.    I recommended speech therapy as initial primary management, with goals including improving laryngeal hygiene, reducing laryngeal irritability, improving laryngeal efficiency, improving respiratory/phonatory coordination and improving phonatory mechanics. I expect that reducing her  laryngeal sensitivity will also help with her vocal function.    With respect to the intermittent mild sluggishness of the left true vocal fold, we discussed consideration of neck imaging to rule out occult contributing lesions. We discussed that the likelihood of significant findings is low, but non-zero. For now she would prefer to hold off, but will call if she changes her mind. We discussed that if any of her voice symptoms worsen over time, imaging would certainly be recommended.    She will return as needed. I appreciate the opportunity to participate in the care of this pleasant patient.              Meghana Dunn MD

## 2017-11-11 ENCOUNTER — HEALTH MAINTENANCE LETTER (OUTPATIENT)
Age: 62
End: 2017-11-11

## 2017-11-11 PROBLEM — R49.0 DYSPHONIA: Status: ACTIVE | Noted: 2017-11-11

## 2017-11-11 PROBLEM — J38.7 IRRITABLE LARYNX SYNDROME: Status: ACTIVE | Noted: 2017-11-11

## 2017-11-30 ENCOUNTER — OFFICE VISIT (OUTPATIENT)
Dept: OTOLARYNGOLOGY | Facility: CLINIC | Age: 62
End: 2017-11-30

## 2017-11-30 DIAGNOSIS — R49.0 DYSPHONIA: Primary | ICD-10-CM

## 2017-11-30 DIAGNOSIS — J38.7 IRRITABLE LARYNX SYNDROME: ICD-10-CM

## 2017-11-30 NOTE — MR AVS SNAPSHOT
After Visit Summary   11/30/2017    Carol More    MRN: [id number]           Patient Information     Date Of Birth          1955        Visit Information        Provider Department      11/30/2017 9:00 AM Pebbles Cleveland [facility]        Today's Diagnoses     Dysphonia    -  1    Irritable larynx syndrome           Follow-ups after your visit        Your next 10 appointments already scheduled     Dec 22, 2017  2:00 PM CST   (Arrive by 1:45 PM)   RETURN SLP VOICE with [facility] ([facility])    [address] [place] [id number] [phone]            Jan 04, 2018 11:00 AM CST   (Arrive by 10:45 AM)   RETURN SLP VOICE with [facility] ([facility])    [address] [place] [id number] [phone]              Who to contact     Please call your clinic at [phone] to:    Ask questions about your health    Make or cancel appointments    Discuss your medicines    Learn about your test results    Speak to your doctor   If you have compliments or concerns about an experience at your clinic, or if you wish to file a complaint, please contact [facility] Patient Relations at [phone] or email us at [email or web address]         Additional Information About Your Visit        MyChart Information     [employer] gives you secure access to your electronic health record. If you see a primary care provider, you can also send messages to your care team and make appointments. If you have questions, please call your primary care clinic.  If you do not have a primary care provider, please call [phone] and they will assist you.      [employer] is an electronic gateway that provides easy, online access to your medical records. With [employer], you can request a clinic appointment, read your test results, renew a  prescription or communicate with your care team.     To access your existing account, please contact your [facility] or call [phone] for assistance.        Care EveryWhere ID     This is your Care EveryWhere ID. This could be used by other organizations to access your [place] medical records  [id number]         Blood Pressure from Last 3 Encounters:   No data found for BP    Weight from Last 3 Encounters:   No data found for Wt              We Performed the Following     SPEECH/HEARING THERAPY, INDIVIDUAL        Primary Care Provider    None Specified       No primary provider on file.        Equal Access to Services     [facility] HospitalMARCEL : Hadii aad ku hadasho Soomaali, waaxda luqadaha, qaybta kaalmada adeegyada, lizz machuca . So [facility] [phone].    ATENCIÓN: Si habla español, tiene a rg disposición servicios gratuitos de asistencia lingüística. Llame al [phone].    We comply with applicable federal civil rights laws and [place] laws. We do not discriminate on the basis of race, color, national origin, age, disability, sex, sexual orientation, or gender identity.            Thank you!     Thank you for choosing [facility]  for your care. Our goal is always to provide you with excellent care. Hearing back from our patients is one way we can continue to improve our services. Please take a few minutes to complete the written survey that you may receive in the mail after your visit with us. Thank you!             Your Updated Medication List - Protect others around you: Learn how to safely use, store and throw away your medicines at www.disposemymeds.org.          This list is accurate as of: 11/30/17 11:59 PM.  Always use your most recent med list.                   Brand Name Dispense Instructions for use Diagnosis    ALLEGRA PO      Take 30 mg by mouth daily        EPINEPHrine 0.3 MG/0.3ML injection 2-pack    EPIPEN/ADRENACLICK/or ANY BX  GENERIC EQUIV     Inject 0.3 mg into the muscle        FLUoxetine 10 MG capsule    PROzac     Take 30 mg by mouth        ibuprofen 600 MG tablet    ADVIL/MOTRIN     Take 600 mg by mouth        levothyroxine 112 MCG tablet    SYNTHROID/LEVOTHROID     Take 112 mcg by mouth

## 2017-11-30 NOTE — LETTER
"11/30/2017       RE: Carol More  [address] [facility] [address]     Dear Colleague,    Thank you for referring your patient, Carol More, to the [facility] at [facility]. Please see a copy of my visit note below.    [facility] VOICE CLINIC  THERAPY NOTE (CPT 78445)    Patient: Carol More  Date of Service: 11/30/2017  Referring physician: Dr. Dunn  Impressions from most recent evaluation:  \"IMPRESSIONS: R49.0 (Dysphonia) and J38.7 (Irritable Larynx Syndrome)     Laryngeal evaluation demonstrated:  All findings were within normal limits with the exception of the following salient features:     Minimal post nasal drainage observed.    Vocal fold mobility was overall within normal limits; occasionally the left vocal fold appeared slightly reduced in mobility (possibly secondary to muscle tension dysphonia).    Moderate hyperfunction noted with connected speech tasks; hyperfunction also noted during a singing task.  However, use of a forward resonance technique helped reduce hyperfunction.    Symmetry:    Amplitude: WNL bilaterally    Symmetry: intermittently asymmetrical.    Glottic adduction: complete    Phase: WNL     Dysphonia/discomfort is accounted for by the hyperfunction and imbalanced function of the intrinsic and extrinsic laryngeal musculature  \"    SUBJECTIVE:    Symptoms are stable since her initial evaluation. This is her first therapy session.    OBJECTIVE:  VOICE:    Roughness: Mild to moderate Intermittent    Breathiness: Mild Intermittent    Strain: Mild to moderate Intermittent    Back focus while singing    Habitual pitch is around Ab3    THERAPEUTIC ACTIVITIES  Chronic cough / throat clearing reduction therapy    Suppression and substitution strategies were instructed including    Swallowing substitution techniques    Breathing suppression techniques to reduce laryngeal tension    Low impact glottic coup and soft cough    Techniques to raise awareness of " "habitual throat clearing    Exercises to promote optimal respiratory mechanics    I provided explanation of the anatomy and physiology of respiration for speech and singing; she found this to be helpful    She demonstrated excessive upper thoracic engagement during inhalation    Demonstrated difficulty allowing abdominal relaxation for inhalation    Practiced in a prone and supine position on the massage table, with tactile cue of a hand on the low rib-cage to facilitate awareness of low respiratory engagement    With clinician support, patient was able to demonstrate improved abdominal relaxation and engagement on inhalation    Optimal exhalation using inward engagement of the abdominal wall with no corresponding collapse of the upper chest cavity was trained using the pulsed \"sh\" task    Semi-Occluded Vocal Tract (SOVT) exercises instructed to reduce laryngeal tension, promote vocal fold pliability, and coordinate respiration and phonation    Straw with water resistance, as well as sustained /z/ were found to be most facilitating     Sustained phonation, and voice vs. voiceless productions used to promote easy voicing and raise awareness of laryngeal tension    Ascending and descending glides utilized to promote vocal fold pliability    Advanced to /ju/ glides at the word level to promote forward locus of resonance     Instructed with a descending 5th, this was helpful    Instructed to use these exercises as a warm-up / cooldown, and to re-calibrate the voice throughout the day.    Counseling and Education:    Asked many questions about the nature of her symptoms, and I answered all of these thoroughly.    I provided an audio recording and handouts of today's therapeutic activities to facilitate practice.    ASSESSMENT/PLAN  PROGRESS TOWARD LONG TERM GOALS:   Minimal at this point, as this is first session, but good learning today    IMPRESSIONS: IMPRESSIONS: R49.0 (Dysphonia) and J38.7 (Irritable Larynx " "Syndrome).  Ms. More demonstrated good learning today of strategies to reduce her laryngeal hypersensitivity secondary to irritable larynx syndrome, as well as techniques to improve laryngeal efficiency and reduce dysphonia.    PLAN: I will see Ms. More in 2 weeks, at which point we will continue to work on strategies to improve breath flow with speech and resonant voice techniques.   For practice goals see AVS.     TOTAL SERVICE TIME: 60 minutes  TREATMENT (15516): 60 minutes  NO CHARGE FACILITY FEE (38762)    Pebbles Cleveland M.M. (voice), M.A., CCC/SLP  Speech-Language Pathologist  [facility]  [phone]          After Visit Summary    Patient: Carol More  Date of Visit: 2017    Cough/ Throat Clearing/ Mucus/ Irritation:      Sip of water     Gargle  o With a voice  o Tilt your head side to side  o Lakeline chirp -  kakakaaa kakakaaa     Swallow    Hum + swallow    Breathe in through rounded lips + out with a repeated  sh   + swallow    Soft throat clear \"eh, eh, eh\"  +  swallow    Suck on a lozenge with Pectin (avoid mint or menthol) or a sugar-free candy, gum, \"wet\" snacks (apples, pineapple, grapes, etc).  Also consider Xylitol products, like the Spry brand of lozenges, gum, spray    Breathing:    In the morning and evening (twice daily) for 2-5 minutes:   o Breathe while lying on your back with your face and knees up. Hands on tummy and chest.  Take a breath in with rounded lips and exhale with a  Sh , \"Sss\" \"Fffff\"   o Inhale  = Inflate; exhale = deflate  o 3x each: try breathin in/8 out, 5/10, 3/4  o Throughout the day (2-3x/day for just a couple minutes) check breathing while keeping shoulders relaxed (riding to and from school, etc.)    Breathing Tips:  o Keep shoulders and chest down  o Don t overextend your neck; keep chin level or slightly down    Voice: (check out [employer] on iPhone)    Semi-occluded vocal tract exercise:   o Zzzzzzzzz/ Bubbles (straw in 1.5 to 2  of water) " 3-4x/day for 1-2 min:  o 3x: blow bubbles and add a sustained  who  or an  oo  (Ab3 )  o 3x: blow bubbles and vary  who  gliding up and down             Up and down like a sine wave  o 3x: blow bubbles on a sustained/ varied pitch soft to loud to soft (messa di voce)    o 1-2x: Happy birthday bubbles (keep connected)  These exercises are great for:    *warm up / cool down - Part of the morning routing and before and after extended voice use.    *tissue mobilization exercise - Improving the condition and pliability of the vocal folds.    *Abdominal breathing and applying optimal breath flow to speech/singing.        u  words (2-3 x per day)  o 5 words with use of arm  o Breathe first and add a  yawn & sigh  shape to sound  o 5-1 (Eb4 to Ab3), or just pick comfortable pitches.    Pebbles Cleveland M.M. (voice), M.A., CCC/SLP  Speech-Language Pathologist  [facility]  [phone]            Again, thank you for allowing me to participate in the care of your patient.      Sincerely,    Pebbles Cleveland, SLP

## 2017-11-30 NOTE — PROGRESS NOTES
"After Visit Summary    Patient: Carol More  Date of Visit: 2017    Cough/ Throat Clearing/ Mucus/ Irritation:      Sip of water     Gargle  o With a voice  o Tilt your head side to side  o Lewis manley -  otis berg     Swallow    Hum + swallow    Breathe in through rounded lips + out with a repeated  sh   + swallow    Soft throat clear \"eh, eh, eh\"  +  swallow    Suck on a lozenge with Pectin (avoid mint or menthol) or a sugar-free candy, gum, \"wet\" snacks (apples, pineapple, grapes, etc).  Also consider Xylitol products, like the Spry brand of lozenges, gum, spray    Breathing:    In the morning and evening (twice daily) for 2-5 minutes:   o Breathe while lying on your back with your face and knees up. Hands on tummy and chest.  Take a breath in with rounded lips and exhale with a  Sh , \"Sss\" \"Fffff\"   o Inhale  = Inflate; exhale = deflate  o 3x each: try breathin in/8 out, 5/10, 3/4  o Throughout the day (2-3x/day for just a couple minutes) check breathing while keeping shoulders relaxed (riding to and from school, etc.)    Breathing Tips:  o Keep shoulders and chest down  o Don t overextend your neck; keep chin level or slightly down    Voice: (check out [employer] on iPhone)    Semi-occluded vocal tract exercise:   o Zzzzzzzzz/ Bubbles (straw in 1.5 to 2  of water) 3-4x/day for 1-2 min:  o 3x: blow bubbles and add a sustained  who  or an  oo  (Ab3 )  o 3x: blow bubbles and vary  who  gliding up and down             Up and down like a sine wave  o 3x: blow bubbles on a sustained/ varied pitch soft to loud to soft (messa di voce)    o 1-2x: Happy birthday bubbles (keep connected)  These exercises are great for:    *warm up / cool down - Part of the morning routing and before and after extended voice use.    *tissue mobilization exercise - Improving the condition and pliability of the vocal folds.    *Abdominal breathing and applying optimal breath flow to speech/singing.        u  words (2-3 " x per day)  o 5 words with use of arm  o Breathe first and add a  yawn & sigh  shape to sound  o 5-1 (Eb4 to Ab3), or just pick comfortable pitches.    Pebbles Cleveland M.M. (voice), MMAXIMILIAN., CCC/SLP  Speech-Language Pathologist  [facility]  [phone]

## 2017-11-30 NOTE — PROGRESS NOTES
"[facility] VOICE CLINIC  THERAPY NOTE ([facility] [address])    Patient: Carol More  Date of Service: 11/30/2017  Referring physician: Dr. Dunn  Impressions from most recent evaluation:  \"IMPRESSIONS: R49.0 (Dysphonia) and J38.7 (Irritable Larynx Syndrome)     Laryngeal evaluation demonstrated:  All findings were within normal limits with the exception of the following salient features:     Minimal post nasal drainage observed.    Vocal fold mobility was overall within normal limits; occasionally the left vocal fold appeared slightly reduced in mobility (possibly secondary to muscle tension dysphonia).    Moderate hyperfunction noted with connected speech tasks; hyperfunction also noted during a singing task.  However, use of a forward resonance technique helped reduce hyperfunction.    Symmetry:    Amplitude: WNL bilaterally    Symmetry: intermittently asymmetrical.    Glottic adduction: complete    Phase: WNL     Dysphonia/discomfort is accounted for by the hyperfunction and imbalanced function of the intrinsic and extrinsic laryngeal musculature  \"    SUBJECTIVE:    Symptoms are stable since her initial evaluation. This is her first therapy session.    OBJECTIVE:  VOICE:    Roughness: Mild to moderate Intermittent    Breathiness: Mild Intermittent    Strain: Mild to moderate Intermittent    Back focus while singing    Habitual pitch is around Ab3    THERAPEUTIC ACTIVITIES  Chronic cough / throat clearing reduction therapy    Suppression and substitution strategies were instructed including    Swallowing substitution techniques    Breathing suppression techniques to reduce laryngeal tension    Low impact glottic coup and soft cough    Techniques to raise awareness of habitual throat clearing    Exercises to promote optimal respiratory mechanics    I provided explanation of the anatomy and physiology of respiration for speech and singing; she found this to be helpful    She demonstrated excessive upper thoracic engagement " "during inhalation    Demonstrated difficulty allowing abdominal relaxation for inhalation    Practiced in a prone and supine position on the massage table, with tactile cue of a hand on the low rib-cage to facilitate awareness of low respiratory engagement    With clinician support, patient was able to demonstrate improved abdominal relaxation and engagement on inhalation    Optimal exhalation using inward engagement of the abdominal wall with no corresponding collapse of the upper chest cavity was trained using the pulsed \"sh\" task    Semi-Occluded Vocal Tract (SOVT) exercises instructed to reduce laryngeal tension, promote vocal fold pliability, and coordinate respiration and phonation    Straw with water resistance, as well as sustained /z/ were found to be most facilitating     Sustained phonation, and voice vs. voiceless productions used to promote easy voicing and raise awareness of laryngeal tension    Ascending and descending glides utilized to promote vocal fold pliability    Advanced to /ju/ glides at the word level to promote forward locus of resonance     Instructed with a descending 5th, this was helpful    Instructed to use these exercises as a warm-up / cooldown, and to re-calibrate the voice throughout the day.    Counseling and Education:    Asked many questions about the nature of her symptoms, and I answered all of these thoroughly.    I provided an audio recording and handouts of today's therapeutic activities to facilitate practice.    ASSESSMENT/PLAN  PROGRESS TOWARD LONG TERM GOALS:   Minimal at this point, as this is first session, but good learning today    IMPRESSIONS: IMPRESSIONS: R49.0 (Dysphonia) and J38.7 (Irritable Larynx Syndrome).  Ms. More demonstrated good learning today of strategies to reduce her laryngeal hypersensitivity secondary to irritable larynx syndrome, as well as techniques to improve laryngeal efficiency and reduce dysphonia.    PLAN: I will see Ms. More in 2 weeks, " at which point we will continue to work on strategies to improve breath flow with speech and resonant voice techniques.   For practice goals see AVS.     TOTAL SERVICE TIME: 60 minutes  TREATMENT (23728): 60 minutes  NO CHARGE FACILITY FEE (95488)    Pebbles Cleveland M.M. (voice) MMAXIMILIAN., CCC/SLP  Speech-Language Pathologist  [facility]  [phone]

## 2017-11-30 NOTE — LETTER
Date:December 8, 2017      Patient was self referred, no letter generated. Do not send.        [facility] Health Information

## 2018-01-04 ENCOUNTER — OFFICE VISIT (OUTPATIENT)
Dept: OTOLARYNGOLOGY | Facility: CLINIC | Age: 63
End: 2018-01-04
Payer: COMMERCIAL

## 2018-01-04 DIAGNOSIS — J38.7 IRRITABLE LARYNX SYNDROME: ICD-10-CM

## 2018-01-04 DIAGNOSIS — R49.0 DYSPHONIA: Primary | ICD-10-CM

## 2018-01-04 NOTE — LETTER
"2018       RE: Caorl More  [address] [facility] [address]     Dear Colleague,    Thank you for referring your patient, Carol More, to the [facility]. Please see a copy of my visit note below.    [facility] VOICE CLINIC  THERAPY NOTE (CPT 19923)    Patient: Carol More  Date of Service: 2018  Referring physician: Dr. Dunn  Impressions from most recent evaluation (17):  \"IMPRESSIONS: R49.0 (Dysphonia) and J38.7 (Irritable Larynx Syndrome)     Laryngeal evaluation demonstrated:  All findings were within normal limits with the exception of the following salient features:     Minimal post nasal drainage observed.    Vocal fold mobility was overall within normal limits; occasionally the left vocal fold appeared slightly reduced in mobility (possibly secondary to muscle tension dysphonia).    Moderate hyperfunction noted with connected speech tasks; hyperfunction also noted during a singing task.  However, use of a forward resonance technique helped reduce hyperfunction.    Symmetry:    Amplitude: WNL bilaterally    Symmetry: intermittently asymmetrical.    Glottic adduction: complete    Phase: WNL      Dysphonia/discomfort is accounted for by the hyperfunction and imbalanced function of the intrinsic and extrinsic laryngeal musculature  \"       SUBJECTIVE:  Since her last session, Ms. More reports the following:     Overall she reports that symptoms are stable    Hurdles:      She had to cancel a recent therapy appointment due to the loss of a relative in her family.   She sang at the  and also at [employer] recently, but has not been able to reach many pitches in her upper range.    Resumed her hormone treatments.  She has been very dry and uncomfortable since     irregualr practice with and without the recording    The after visit summary is helpful to facilitate practice.    OBJECTIVE:  Ms. More presents today with the following:  Voice " "quality:    Habitual pitch is around Ab3    Mild to moderate intermittent roughness, and strain in upper register    Back focus while singing     Stylistic \"swooping\" with singing appears to lead to pitch uncertainty/instability    THERAPEUTIC ACTIVITIES    Demonstrated previous exercises.  o demonstrated improved technique  o appropriate redirection provided  o instruction provided for increased level of complexity/difficulty  o Provided modifications to help reduce ongoing irritation.  She believes her irritable larynx symptoms are improving since she resumed her hormone treatment therapy.      Semi-Occluded Vocal Tract (SOVT) exercises instructed to reduce laryngeal tension, promote vocal fold pliability, and coordinate respiration and phonation    Sustained /z/ was found to be most facilitating     Ascending and descending glides utilized to promote vocal fold pliability    \"Messa di voce\", gradual crescendo and decrescendo to vary medial compression was also utilized to promote vocal fold pliability.    Instructed on the benefits of using these exercises for improved coordination of breath flow with phonation and tissue mobilization.    Instructed on the importance of using these exercises as a warm-up / cool down,  and to re-calibrate the voice throughout the day.    70% accuracy with mild to moderate clinician support    Resonant Voice Therapy (RVT) exercises to promote forward locus of resonance and optimized pattern of laryngeal adduction    Speech material that elicits a high, forward tongue position (/n/) was most facilitating    Syllable level using /n/ in alternation with cardinal vowels on sustained pitches and speech inflection    Word level exercises featuring nasal continuant loaded stimuli    Phrase level exercises featuring nasal continuants in more complex phonemic contexts were employed    Instructed with and interval of a descending 5th, as well as an arpeggio pattern was facilitating, prior to " "progressing to comfortable speech using optimal breath flow.    She demonstrated 60% accuracy with mild to moderate clincian support    Negative practice was employed and was facilitative    Able to recognize improvement in quality and comfort    Exercises to maintain the improved airflow and forward focus in singing  o did a variety of glides, scales, and arpeggio patterns on a variety of neutral syllables including:  - 1-5-1  - 1-3-5-8-5-3-1  o Instruction with a twang/ eliciting high forward tongue position sound (yeah) was helpful to reduce back focus, and increase awareness of the similarities between the speaking and singing voice.  o Use of \"ng\" was helpful to reduce base of tongue involvement  o Use of \"willy\" was facilitating today  o learned how to apply the concepts to singing repertoire.  Began to become aware of stylistic patterns that lead to pitch instability.  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    Counseling and Education:    Asked many questions about the nature of her symptoms, and I answered all of these thoroughly.    A revised regimen for home practice was instructed.    An audio recording (iPhone) of portions of today's session were captured, and handouts of today's therapeutic activities to facilitate practice.    ASSESSMENT/PLAN  PROGRESS TOWARD LONG TERM GOALS:   Modest progress to date, therapeutic methods have been altered to account for this; please see above    IMPRESSIONS:IMPRESSIONS: R49.0 (Dysphonia) and J38.7 (Irritable Larynx Syndrome). Ms. More demonstrated improvement of her irritation and throat clearing since beginning the strategies learned in therapy to reduce irritable larynx syndrome will continue regular practice with the modifications learned today.   She also learned new techniques to reduce dysphonia associated with her speaking and singing voice quality, as well as " early carry over strategies.  We will continue this work during her next session, and I encouraged her to bring some of her music to her next session.    PLAN: I will see Ms. More in 2 weeks, and we agreed to continue with 3 additional sessions at this time.  For practice goals see AVS.     TOTAL SERVICE TIME: 60 minutes  TREATMENT (53804): 60 minutes  NO CHARGE FACILITY FEE (67630)    Pebbles Cleveland M.M. (voice) MMAXIMILIAN., CCC/SLP  Speech-Language Pathologist  [facility]  [phone]            Again, thank you for allowing me to participate in the care of your patient.      Sincerely,    Pebbles Cleveland, SLP

## 2018-01-04 NOTE — LETTER
Date:January 12, 2018      Patient was self referred, no letter generated. Do not send.        [facility] Information

## 2018-01-04 NOTE — PROGRESS NOTES
"[facility] VOICE CLINIC  THERAPY NOTE ([facility] [address])    Patient: Carol More  Date of Service: 2018  Referring physician: Dr. Dunn  Impressions from most recent evaluation (17):  \"IMPRESSIONS: R49.0 (Dysphonia) and J38.7 (Irritable Larynx Syndrome)     Laryngeal evaluation demonstrated:  All findings were within normal limits with the exception of the following salient features:     Minimal post nasal drainage observed.    Vocal fold mobility was overall within normal limits; occasionally the left vocal fold appeared slightly reduced in mobility (possibly secondary to muscle tension dysphonia).    Moderate hyperfunction noted with connected speech tasks; hyperfunction also noted during a singing task.  However, use of a forward resonance technique helped reduce hyperfunction.    Symmetry:    Amplitude: WNL bilaterally    Symmetry: intermittently asymmetrical.    Glottic adduction: complete    Phase: WNL      Dysphonia/discomfort is accounted for by the hyperfunction and imbalanced function of the intrinsic and extrinsic laryngeal musculature  \"       SUBJECTIVE:  Since her last session, Ms. More reports the following:     Overall she reports that symptoms are stable    Hurdles:      She had to cancel a recent therapy appointment due to the loss of a relative in her family.   She sang at the  and also at [employer] recently, but has not been able to reach many pitches in her upper range.    Resumed her hormone treatments.  She has been very dry and uncomfortable since     irregualr practice with and without the recording    The after visit summary is helpful to facilitate practice.    OBJECTIVE:  Ms. Moer presents today with the following:  Voice quality:    Habitual pitch is around Ab3    Mild to moderate intermittent roughness, and strain in upper register    Back focus while singing     Stylistic \"swooping\" with singing appears to lead to pitch uncertainty/instability    THERAPEUTIC " "ACTIVITIES    Demonstrated previous exercises.  o demonstrated improved technique  o appropriate redirection provided  o instruction provided for increased level of complexity/difficulty  o Provided modifications to help reduce ongoing irritation.  She believes her irritable larynx symptoms are improving since she resumed her hormone treatment therapy.      Semi-Occluded Vocal Tract (SOVT) exercises instructed to reduce laryngeal tension, promote vocal fold pliability, and coordinate respiration and phonation    Sustained /z/ was found to be most facilitating     Ascending and descending glides utilized to promote vocal fold pliability    \"Messa di voce\", gradual crescendo and decrescendo to vary medial compression was also utilized to promote vocal fold pliability.    Instructed on the benefits of using these exercises for improved coordination of breath flow with phonation and tissue mobilization.    Instructed on the importance of using these exercises as a warm-up / cool down,  and to re-calibrate the voice throughout the day.    70% accuracy with mild to moderate clinician support    Resonant Voice Therapy (RVT) exercises to promote forward locus of resonance and optimized pattern of laryngeal adduction    Speech material that elicits a high, forward tongue position (/n/) was most facilitating    Syllable level using /n/ in alternation with cardinal vowels on sustained pitches and speech inflection    Word level exercises featuring nasal continuant loaded stimuli    Phrase level exercises featuring nasal continuants in more complex phonemic contexts were employed    Instructed with and interval of a descending 5th, as well as an arpeggio pattern was facilitating, prior to progressing to comfortable speech using optimal breath flow.    She demonstrated 60% accuracy with mild to moderate clincian support    Negative practice was employed and was facilitative    Able to recognize improvement in quality and " "comfort    Exercises to maintain the improved airflow and forward focus in singing  o did a variety of glides, scales, and arpeggio patterns on a variety of neutral syllables including:  - 1-5-1  - 1-3-5-8-5-3-1  o Instruction with a twang/ eliciting high forward tongue position sound (yeah) was helpful to reduce back focus, and increase awareness of the similarities between the speaking and singing voice.  o Use of \"ng\" was helpful to reduce base of tongue involvement  o Use of \"willy\" was facilitating today  o learned how to apply the concepts to singing repertoire.  Began to become aware of stylistic patterns that lead to pitch instability.  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    Counseling and Education:    Asked many questions about the nature of her symptoms, and I answered all of these thoroughly.    A revised regimen for home practice was instructed.    An audio recording (iPhone) of portions of today's session were captured, and handouts of today's therapeutic activities to facilitate practice.    ASSESSMENT/PLAN  PROGRESS TOWARD LONG TERM GOALS:   Modest progress to date, therapeutic methods have been altered to account for this; please see above    IMPRESSIONS:IMPRESSIONS: R49.0 (Dysphonia) and J38.7 (Irritable Larynx Syndrome). Ms. More demonstrated improvement of her irritation and throat clearing since beginning the strategies learned in therapy to reduce irritable larynx syndrome will continue regular practice with the modifications learned today.   She also learned new techniques to reduce dysphonia associated with her speaking and singing voice quality, as well as early carry over strategies.  We will continue this work during her next session, and I encouraged her to bring some of her music to her next session.    PLAN: I will see Ms. More in 2 weeks, and we agreed to continue with 3 " additional sessions at this time.  For practice goals see AVS.     TOTAL SERVICE TIME: 60 minutes  TREATMENT (04604): 60 minutes  NO CHARGE FACILITY FEE (36595)    Pebbles Cleveland M.M. (voice) MNATALIE, CCC/SLP  Speech-Language Pathologist  [facility]  [phone]

## 2019-11-03 ENCOUNTER — HEALTH MAINTENANCE LETTER (OUTPATIENT)
Age: 64
End: 2019-11-03

## 2020-11-16 ENCOUNTER — HEALTH MAINTENANCE LETTER (OUTPATIENT)
Age: 65
End: 2020-11-16

## 2021-09-18 ENCOUNTER — HEALTH MAINTENANCE LETTER (OUTPATIENT)
Age: 66
End: 2021-09-18

## 2021-11-13 ENCOUNTER — HEALTH MAINTENANCE LETTER (OUTPATIENT)
Age: 66
End: 2021-11-13

## 2022-01-08 ENCOUNTER — HEALTH MAINTENANCE LETTER (OUTPATIENT)
Age: 67
End: 2022-01-08

## 2022-11-19 ENCOUNTER — HEALTH MAINTENANCE LETTER (OUTPATIENT)
Age: 67
End: 2022-11-19

## 2023-11-19 ENCOUNTER — HEALTH MAINTENANCE LETTER (OUTPATIENT)
Age: 68
End: 2023-11-19

## 2023-12-19 NOTE — MR AVS SNAPSHOT
After Visit Summary   11/6/2017    Carol More    MRN: [id number]           Patient Information     Date Of Birth          1955        Visit Information        Provider Department      11/6/2017 11:50 AM Pebbles Cleveland [facility]LP [facility]         Follow-ups after your visit        Your next 10 appointments already scheduled     Nov 30, 2017  9:00 AM CST   (Arrive by 8:45 AM)   [facility]LP [facility]ICE with [facility] ([facility])    [address] [place] [id number] [phone]            Dec 07, 2017 11:00 AM CST   (Arrive by 10:45 AM)   [facility]LP [facility]ICE with [facility] ([facility])    [address] [place] [id number] [phone]            Dec 22, 2017  2:00 PM CST   (Arrive by 1:45 PM)   RETURN [facility]LP [facility]ICE with [facility] ([facility])    [address] [place] [id number] [phone]            Jan 04, 2018 11:00 AM CST   (Arrive by 10:45 AM)   RETURN SLP VOICE with [facility] ([facility])    [address] [place] [id number] [phone]              Who to contact     Please call your clinic at [phone] to:    Ask questions about your health    Make or cancel appointments    Discuss your medicines    Learn about your test results    Speak to your doctor   If you have compliments or concerns about an experience at your clinic, or if you wish to file a complaint, please contact [facility] Patient Relations at [phone] or email us at [email or web address]         Additional Information About Your Visit        MyChart Information     MyChart gives you secure access to your electronic health record. If you see a primary care provider, you can also send  Anesthesia Pre Eval Note    Anesthesia ROS/Med Hx        Anesthetic Complication History:  Patient does not have a history of anesthetic complications    No history of malignant hyperthermia    Pulmonary Review:  Patient does not have a pulmonary history    Negative for COPD   Negative for asthma    Neuro/Psych Review:    Negative for seizures   Negative for TIA  Positive for psychiatric history - Anxiety    Cardiovascular Review:  Patient does not have a cardiovascular history     Negative for past MI  Negative for CAD  Negative CABG    GI/HEPATIC/RENAL Review:     Positive for GERDNegative for liver disease  Negative for renal disease    End/Other Review:  Patient does not have an endo/other history  Negative for diabetes  Negative for hypothyroidism  Negative for hyperthyroidism  Additional Results:      ALLERGIES:  No Known Allergies   No results found for: \"WBC\", \"RBC\", \"HGB\", \"HCT\", \"MCV\", \"MCH\", \"MCHC\", \"RDWCV\", \"SODIUM\", \"POTASSIUM\", \"CHLORIDE\", \"CO2\", \"GLUCOSE\", \"BUN\", \"CREATININE\", \"GFRESTIMATE\", \"EGFRNONAFR\", \"GFRA\", \"GFRNA\", \"CALCIUM\", \"HCG\", \"PLT\", \"PTT\", \"INR\"   Past Medical History:  No date: Anxiety  No date: Gastroesophageal reflux disease  No date: Sinusitis, chronic  History reviewed. No pertinent surgical history.   Prior to Admission medications :  Medication psyllium (METAMUCIL [employer] FIBER) 58.12 % packet for oral suspension, Sig daily. 1tsp/8oz, Start Date , End Date , Taking? Yes, Authorizing Provider Provider, Outside          Relevant Problems   No relevant active problems       Physical Exam     Airway   Mallampati: II  TM Distance: >3 FB  Neck ROM: Full    Cardiovascular  Cardiovascular exam normal  Cardio Rhythm: Regular    Head Assessment  Head assessment: Normocephalic and Atraumatic    General Assessment  General Assessment: Alert and oriented and No acute distress    Dental Exam  Dental exam normal    Pulmonary Exam  Pulmonary exam normal    Anesthesia Plan:    ASA Status:  2  Anesthesia Type: MAC    Induction: Intravenous  Preferred Airway Type: Mask  Maintenance: TIVA  Premedication: IV      Post-op Pain Management: Per Surgeon      Checklist  Reviewed: Lab Results, EKG, Patient Summary, Beta Blocker Status, DNR Status, NPO Status, Allergies, Medications, Problem list, Past Med History and Anesthesia Record  Consent/Risks Discussed Statement:  The proposed anesthetic plan, including its risks and benefits, have been discussed with the Patient along with the risks and benefits of alternatives. Questions were encouraged and answered and the patient and/or representative understands and agrees to proceed.        I discussed with the patient (and/or patient's legal representative) the risks and benefits of the proposed anesthesia plan, MAC, which may include services performed by other anesthesia providers.    Alternative anesthesia plans, if available, were reviewed with the patient (and/or patient's legal representative). Discussion has been held with the patient (and/or patient's legal representative) regarding risks of anesthesia, which include  emergent situations that may require change in anesthesia plan.    The patient (and/or patient's legal representative) has indicated understanding, his/her questions have been answered, and he/she wishes to proceed with the planned anesthetic.    Blood Products: Not Anticipated     messages to your care team and make appointments. If you have questions, please call your primary care clinic.  If you do not have a primary care provider, please call [phone] and they will assist you.      [employer] is an electronic gateway that provides easy, online access to your medical records. With [employer], you can request a clinic appointment, read your test results, renew a prescription or communicate with your care team.     To access your existing account, please contact your [facility] or call [phone] for assistance.        Care EveryWhere ID     This is your Care EveryWhere ID. This could be used by other organizations to access your [facility] medical records  [id number]         Blood Pressure from Last 3 Encounters:   No data found for BP    Weight from Last 3 Encounters:   No data found for Wt              Today, you had the following     No orders found for display       Primary Care Provider    None Specified       No primary provider on file.        Equal Access to Services     [facility]: Hadii leonor Bennett, misha sanchez, anitra wagner, lizz machuca . So [facility] [phone].    ATENCIÓN: Si habla español, tiene a rg disposición servicios gratuitos de asistencia lingüística. Llame al [phone].    We comply with applicable federal civil rights laws and [place] laws. We do not discriminate on the basis of race, color, national origin, age, disability, sex, sexual orientation, or gender identity.            Thank you!     Thank you for choosing  [facility]  for your care. Our goal is always to provide you with excellent care. Hearing back from our patients is one way we can continue to improve our services. Please take a few minutes to complete the written survey that you may receive in the mail after your visit with us. Thank you!             Your Updated Medication List - Protect others around you:  Learn how to safely use, store and throw away your medicines at www.disposemymeds.org.          This list is accurate as of: 11/6/17  1:36 PM.  Always use your most recent med list.                   Brand Name Dispense Instructions for use Diagnosis    ALLEGRA PO      Take 30 mg by mouth daily        EPINEPHrine 0.3 MG/0.3ML injection 2-pack    EPIPEN/ADRENACLICK/or ANY BX GENERIC EQUIV     Inject 0.3 mg into the muscle        FLUoxetine 10 MG capsule    PROzac     Take 30 mg by mouth        ibuprofen 600 MG tablet    ADVIL/MOTRIN     Take 600 mg by mouth        levothyroxine 112 MCG tablet    SYNTHROID/LEVOTHROID     Take 112 mcg by mouth

## 2024-01-28 ENCOUNTER — HEALTH MAINTENANCE LETTER (OUTPATIENT)
Age: 69
End: 2024-01-28